# Patient Record
Sex: FEMALE
[De-identification: names, ages, dates, MRNs, and addresses within clinical notes are randomized per-mention and may not be internally consistent; named-entity substitution may affect disease eponyms.]

---

## 2024-02-06 PROBLEM — Z00.00 ENCOUNTER FOR PREVENTIVE HEALTH EXAMINATION: Status: ACTIVE | Noted: 2024-02-06

## 2024-02-13 ENCOUNTER — TRANSCRIPTION ENCOUNTER (OUTPATIENT)
Age: 34
End: 2024-02-13

## 2024-02-13 ENCOUNTER — APPOINTMENT (OUTPATIENT)
Dept: NEUROSURGERY | Facility: CLINIC | Age: 34
End: 2024-02-13
Payer: SELF-PAY

## 2024-02-13 DIAGNOSIS — H93.A9 PULSATILE TINNITUS, UNSPECIFIED EAR: ICD-10-CM

## 2024-02-13 PROCEDURE — EDU01: CPT

## 2024-02-13 NOTE — REASON FOR VISIT
[Home] : at home, [unfilled] , at the time of the educational consult. [Medical Office: ([facility])___] : at the medical office located in  [Participant] : the participant [Self] : self [New Patient Visit] : a new patient visit [FreeTextEntry1] : Pulsatile Tinnitus

## 2024-02-13 NOTE — ASSESSMENT
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 This is a sequel of chronic severe stenosis at the junction of the transverse and sigmoid venous sinuses. We discussed the natural history of intracranial venous aneurysms and I explained to the patient that these aneurysms DO NOT cause intracranial hemorrhage or stroke.   At this time, her pulsatile tinnitus is tolerable, and she does not have signs/symptoms of elevated intracranial pressure.  I recommend annual eye exams to rule out papilledema since bilateral venous sinus stenting can predispose her to developing Intracranial hypertension.  Recommendations: Updated MRV Head w/wo Annual Eye Exams to rule out papilledema Follow Up with Me As Needed for Worsening Pulsatile Tinnitus or Signs/Symptoms of IIH

## 2024-02-13 NOTE — HISTORY OF PRESENT ILLNESS
[de-identified] : Ms. ALLEN is a pleasant 33 year old female who presents today with a chief complaint of RIGHT ear pulsatile tinnitus.  It first started in 2017.  It is described as a constant, whooshing sound that is in sync with her pulse.  Pressing on the RIGHT side of her neck improves the sound.  She denies any significant headaches, blurry vision or diplopia.  Her last eye exam was about a year ago.   She had a catheter angiogram in 9/2019 and by her report it was negative.